# Patient Record
Sex: MALE | Race: BLACK OR AFRICAN AMERICAN | Employment: FULL TIME | URBAN - METROPOLITAN AREA
[De-identification: names, ages, dates, MRNs, and addresses within clinical notes are randomized per-mention and may not be internally consistent; named-entity substitution may affect disease eponyms.]

---

## 2019-10-13 ENCOUNTER — HOSPITAL ENCOUNTER (EMERGENCY)
Age: 49
Discharge: HOME | End: 2019-10-13
Attending: EMERGENCY MEDICINE
Payer: SELF-PAY

## 2019-10-13 ENCOUNTER — APPOINTMENT (OUTPATIENT)
Dept: GENERAL RADIOLOGY | Age: 49
End: 2019-10-13
Attending: NURSE PRACTITIONER
Payer: SELF-PAY

## 2019-10-13 VITALS
BODY MASS INDEX: 24.32 KG/M2 | TEMPERATURE: 97.9 F | OXYGEN SATURATION: 98 % | WEIGHT: 146 LBS | DIASTOLIC BLOOD PRESSURE: 79 MMHG | HEIGHT: 65 IN | RESPIRATION RATE: 15 BRPM | HEART RATE: 98 BPM | SYSTOLIC BLOOD PRESSURE: 113 MMHG
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
## 2019-10-13 DIAGNOSIS — S43.401A SPRAIN OF RIGHT SHOULDER, UNSPECIFIED SHOULDER SPRAIN TYPE, INITIAL ENCOUNTER: ICD-10-CM

## 2019-10-13 PROCEDURE — 74011250637 HC RX REV CODE- 250/637: Performed by: NURSE PRACTITIONER

## 2019-10-13 PROCEDURE — 99283 EMERGENCY DEPT VISIT LOW MDM: CPT

## 2019-10-13 PROCEDURE — 73030 X-RAY EXAM OF SHOULDER: CPT

## 2019-10-13 RX ORDER — NAPROXEN 500 MG/1
500 TABLET ORAL 2 TIMES DAILY WITH MEALS
Qty: 20 TAB | Refills: 0 | Status: SHIPPED | OUTPATIENT
Start: 2019-10-13 | End: 2019-10-23

## 2019-10-13 RX ORDER — IBUPROFEN 400 MG/1
800 TABLET ORAL
Status: COMPLETED | OUTPATIENT
Start: 2019-10-13 | End: 2019-10-13

## 2019-10-13 RX ORDER — METHOCARBAMOL 500 MG/1
500 TABLET, FILM COATED ORAL 4 TIMES DAILY
Qty: 30 TAB | Refills: 0 | Status: SHIPPED | OUTPATIENT
Start: 2019-10-13

## 2019-10-13 RX ADMIN — IBUPROFEN 800 MG: 400 TABLET, FILM COATED ORAL at 18:48

## 2019-10-13 NOTE — ED NOTES
Emergency Department Nursing Plan of Care       The Nursing Plan of Care is developed from the Nursing assessment and Emergency Department Attending provider initial evaluation. The plan of care may be reviewed in the ED Provider note.     The Plan of Care was developed with the following considerations:   Patient / Family readiness to learn indicated by:verbalized understanding  Persons(s) to be included in education: patient  Barriers to Learning/Limitations:No    Signed     Nadira Fuentes RN    10/13/2019   6:29 PM

## 2019-10-13 NOTE — LETTER
[facility] EMERGENCY DEPT 
[address] [address] 
[phone] Work/School Note Date: 10/13/2019 To Whom It May concern: 
 
Soha Patten was seen and treated today in the emergency room by the following provider(s): 
No providers found. Soha Patten may return to work on 10/16/2019. Sincerely, 
 
 
 
 
Elsie Ortiz RN.

## 2019-10-13 NOTE — ED NOTES
Patient educated on discharge instructions and two prescriptions . Patient verbalized understanding of eduction. Patient given discharge instructions and two prescriptions. Patient ambulated out of ED. No acute distress noted. Emergency Department Nursing Plan of Care       The Nursing Plan of Care is developed from the Nursing assessment and Emergency Department Attending provider initial evaluation. The plan of care may be reviewed in the ED Provider note.     The Plan of Care was developed with the following considerations:   Patient / Family readiness to learn indicated by:verbalized understanding  Persons(s) to be included in education: patient  Barriers to Learning/Limitations:No    Signed     Char Davidson RN    10/13/2019   7:24 PM
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
## 2022-12-14 ENCOUNTER — HOSPITAL ENCOUNTER (EMERGENCY)
Age: 52
Discharge: HOME | End: 2022-12-15
Attending: STUDENT IN AN ORGANIZED HEALTH CARE EDUCATION/TRAINING PROGRAM
Payer: MEDICAID

## 2022-12-14 DIAGNOSIS — T40.2X1A OPIOID OVERDOSE, ACCIDENTAL OR UNINTENTIONAL, INITIAL ENCOUNTER (HCC): Primary | ICD-10-CM

## 2022-12-14 PROCEDURE — 74011250636 HC RX REV CODE- 250/636: Performed by: STUDENT IN AN ORGANIZED HEALTH CARE EDUCATION/TRAINING PROGRAM

## 2022-12-14 PROCEDURE — 99284 EMERGENCY DEPT VISIT MOD MDM: CPT

## 2022-12-14 PROCEDURE — 74011250637 HC RX REV CODE- 250/637: Performed by: STUDENT IN AN ORGANIZED HEALTH CARE EDUCATION/TRAINING PROGRAM

## 2022-12-14 RX ORDER — ONDANSETRON 4 MG/1
4 TABLET, ORALLY DISINTEGRATING ORAL
Status: COMPLETED | OUTPATIENT
Start: 2022-12-14 | End: 2022-12-14

## 2022-12-14 RX ORDER — ACETAMINOPHEN 325 MG/1
650 TABLET ORAL ONCE
Status: COMPLETED | OUTPATIENT
Start: 2022-12-14 | End: 2022-12-14

## 2022-12-14 RX ADMIN — ONDANSETRON 4 MG: 4 TABLET, ORALLY DISINTEGRATING ORAL at 23:17

## 2022-12-14 RX ADMIN — ACETAMINOPHEN 650 MG: 325 TABLET, FILM COATED ORAL at 23:17

## 2022-12-14 NOTE — Clinical Note
[address] EMERGENCY DEPT  [address] [facility] [address] [phone]    Work/School Note    Date: 12/14/2022    To Whom It May concern:    Valeria Anne was seen and treated today in the emergency room by the following provider(s):  Attending Provider: Doris Lyn MD.      Valeria Anne is excused from work/school on 12/15/22 and 12/16/22. He is medically clear to return to work/school on 12/17/2022.        Sincerely,          Radha Howell MD

## 2022-12-15 VITALS
HEIGHT: 65 IN | OXYGEN SATURATION: 97 % | TEMPERATURE: 98.2 F | BODY MASS INDEX: 21.66 KG/M2 | HEART RATE: 88 BPM | DIASTOLIC BLOOD PRESSURE: 82 MMHG | SYSTOLIC BLOOD PRESSURE: 120 MMHG | WEIGHT: 130 LBS | RESPIRATION RATE: 20 BRPM

## 2022-12-15 RX ORDER — NALOXONE HYDROCHLORIDE 4 MG/.1ML
SPRAY NASAL
Qty: 2 EACH | Refills: 0 | Status: SHIPPED | OUTPATIENT
Start: 2022-12-15

## 2022-12-15 NOTE — ED NOTES
Patient alert and oriented x 4. Patient no longer anxious or shaking and denies complaints. Family is at bedside. MD updated.

## 2022-12-15 NOTE — DISCHARGE INSTRUCTIONS
Take Narcan prescription and use it if this happens again.   Please return to ER if you develop any chest pain, shortness of breath or worsening symptoms

## 2022-12-15 NOTE — ED NOTES
Discharge instructions were given to the patient by Melodie Ellis RN     The patient left the Emergency Department ambulatory, alert and oriented and in no acute distress with 1 prescriptions. The patient was encouraged to call or return to the ED for worsening issues or problems and was encouraged to schedule a follow up appointment for continuing care. The patient verbalized understanding of discharge instructions and prescriptions, all questions were answered. The patient has no further concerns at this time.
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
## 2022-12-15 NOTE — ED NOTES
Emergency Department Nursing Plan of Care       The Nursing Plan of Care is developed from the Nursing assessment and Emergency Department Attending provider initial evaluation. The plan of care may be reviewed in the ED Provider note.     The Plan of Care was developed with the following considerations:   Patient / Family readiness to learn indicated by:verbalized understanding  Persons(s) to be included in education: patient  Barriers to Learning/Limitations:No    Signed     Gunnar Buenrostro RN    12/14/2022   11:08 PM

## 2022-12-15 NOTE — ED PROVIDER NOTES
EMERGENCY DEPARTMENT HISTORY AND PHYSICAL EXAM      Date: 12/14/2022  Patient Name: Kevin Castañeda    History of Presenting Illness     Chief Complaint   Patient presents with    Drug Overdose       History Provided By: Patient    Kevin Castañeda, 50 y.o. male     -year-old male with no reported medical history presenting after drug overdose from home. Patient presents with EMS stating that he used heroin, last that he remembers is being in the bathroom, received 1 mg Narcan with EMS, patient now alert and oriented, stating that he has myalgias, nausea, chest pain, abdominal pain, aching all over but denying regular use of opioids. Patient denies any coingestions. Patient states that he took a powder and believed it to be heroin. Which he snorted. Patient denies any recent complaint such as fevers, chills, URI symptoms, states that he has otherwise been in his usual state of health, no other complaints today. There are no other complaints, changes, or physical findings at this time. PCP: None    No current facility-administered medications on file prior to encounter. Current Outpatient Medications on File Prior to Encounter   Medication Sig Dispense Refill    methocarbamol (ROBAXIN) 500 mg tablet Take 1 Tab by mouth four (4) times daily. 30 Tab 0       Past History     Past Medical History:  History reviewed. No pertinent past medical history. Past Surgical History:  History reviewed. No pertinent surgical history. Family History:  History reviewed. No pertinent family history. Social History:  Social History     Tobacco Use    Smoking status: Every Day     Types: Cigarettes    Smokeless tobacco: Never   Substance Use Topics    Alcohol use: Not Currently    Drug use: Yes     Types: Heroin       Allergies:  No Known Allergies      Review of Systems   Review of Systems   Constitutional:  Negative for activity change, chills, fatigue and fever. HENT:  Negative for congestion and sneezing. Respiratory:  Negative for cough and shortness of breath. Cardiovascular:  Positive for chest pain. Negative for leg swelling. Gastrointestinal:  Positive for abdominal pain and nausea. Negative for constipation, diarrhea and vomiting. Genitourinary:  Negative for decreased urine volume, dysuria and frequency. Musculoskeletal:  Positive for myalgias. Negative for arthralgias. Skin:  Negative for rash. Allergic/Immunologic: Negative for immunocompromised state. Neurological:  Negative for headaches. Hematological:  Does not bruise/bleed easily. Psychiatric/Behavioral:  Negative for confusion. Physical Exam   Physical Exam  Vitals reviewed. Constitutional:       General: He is not in acute distress. Appearance: He is not ill-appearing, toxic-appearing or diaphoretic. HENT:      Head: Normocephalic and atraumatic. Mouth/Throat:      Mouth: Mucous membranes are moist.   Eyes:      Conjunctiva/sclera: Conjunctivae normal.   Cardiovascular:      Rate and Rhythm: Tachycardia present. Pulmonary:      Effort: Pulmonary effort is normal. No tachypnea, accessory muscle usage or respiratory distress. Abdominal:      Palpations: Abdomen is soft. Tenderness: There is no abdominal tenderness. There is no guarding or rebound. Musculoskeletal:         General: No deformity. Cervical back: Neck supple. Skin:     General: Skin is warm and dry. Neurological:      General: No focal deficit present. Mental Status: He is alert and oriented to person, place, and time. Psychiatric:         Mood and Affect: Mood normal.       Diagnostic Study Results     Labs -   No results found for this or any previous visit (from the past 24 hour(s)). Radiologic Studies -   No orders to display     CT Results  (Last 48 hours)      None          CXR Results  (Last 48 hours)      None              Medical Decision Making   I am the first provider for this patient.     I reviewed the vital signs, available nursing notes, past medical history, past surgical history, family history and social history. Vital Signs-Reviewed the patient's vital signs. Patient Vitals for the past 12 hrs:   Temp Pulse Resp BP SpO2   12/15/22 0100 -- 88 -- 120/82 97 %   12/14/22 2300 98.2 °F (36.8 °C) (!) 107 20 117/87 100 %       Records Reviewed: Nursing records and medical records reviewed    Ddx:, Opioid use, opioid overdose    Initial assessment performed. The patients presenting problems have been discussed, and they are in agreement with the care plan formulated and outlined with them. I have encouraged them to ask questions as they arise throughout their visit. MDM  Number of Diagnoses or Management Options  Diagnosis management comments: Is a 59-year-old male presenting after a likely opioid overdose, received Narcan in route for unresponsiveness, is now alert and oriented, complaining of nausea, myalgias, chest pain, abdominal pain, patient denies regular use although symptoms seem consistent with opioid withdrawal with ongoing tachycardia. Patient otherwise well-appearing and denies any coingestion, denies any other medical complaints for which she takes medication for daily, feel that he would benefit from treatment of nausea, pain and reevaluation, hold off on either labs or imaging at this time as I do not feel like they are indicated with likely OD. If patient continues to complain of chest pain and abdominal pain after medications will consider further work-up. ED Course as of 12/15/22 0115   u Dec 15, 2022   0112 Patient is now alert, responsive, states that he has no pain, will prescribe him Narcan and discharge at this time. [RN]      ED Course User Index  [RN] Evelyn Jacobsen MD           Procedures    Critical Care: none    Disposition: dc    DISCHARGE PLAN:  1.    Current Discharge Medication List        START taking these medications    Details   naloxone (Narcan) 4 mg/actuation nasal spray Use 1 spray intranasally, then discard. Repeat with new spray every 2 min as needed for opioid overdose symptoms, alternating nostrils. Qty: 2 Each, Refills: 0  Start date: 12/15/2022           2. Follow-up Information       Follow up With Specialties Details Why [address] [facility] EMERGENCY DEPT Emergency Medicine  If symptoms worsen [address]          3. Return to ED if worse     Diagnosis     Clinical Impression:   1. Opioid overdose, accidental or unintentional, initial encounter [facility])        Attestations:    Andrea Martinez MD    Please note that this dictation was completed with "[employer] the computer voice recognition software. Quite often unanticipated grammatical, syntax, homophones, and other interpretive errors are inadvertently transcribed by the computer software. Please disregard these errors. Please excuse any errors that have escaped final proofreading. Thank you.

## 2023-01-19 ENCOUNTER — TELEPHONE (OUTPATIENT)
Dept: INTERNAL MEDICINE CLINIC | Age: 53
End: 2023-01-19

## 2023-08-09 ENCOUNTER — NURSE TRIAGE (OUTPATIENT)
Dept: OTHER | Facility: CLINIC | Age: 53
End: 2023-08-09

## 2023-08-09 NOTE — TELEPHONE ENCOUNTER
Location of patient: [address] [facility] [place] call from [place] at [facility] with [facility]. Subjective: Caller states \"stomach pain,pt has hernia, pain in belly button area. Pain worsened in 24 hours. Seen in clinic, states he was told he needed to see PCP about BG\"     Current Symptoms: only feels pain with bearing down and pressure to stomach. Denies n/v/d. No previous treatment for hernia. Pt reports scrotum pain and pain with urination. Onset: 1 month ago;     Associated Symptoms:     Pain Severity: 8/10; sharp; inc with pt    Temperature: denies     What has been tried: none    LMP: NA Pregnant: NA    Recommended disposition: Go to ED Now    Care advice provided, patient verbalizes understanding; denies any other questions or concerns; instructed to call back for any new or worsening symptoms. Patient/caller agrees to proceed to nearest Emergency Department    Attention Provider: Thank you for allowing me to participate in the care of your patient. The patient was connected to triage in response to information provided to the ECC/PSC. Please do not respond through this encounter as the response is not directed to a shared pool.         Reason for Disposition   Pain in scrotum persists > 1 hour    Protocols used: Abdominal Pain - Male-ADULT-[place]

## 2024-05-14 ENCOUNTER — NURSE TRIAGE (OUTPATIENT)
Dept: OTHER | Facility: CLINIC | Age: 54
End: 2024-05-14

## 2024-05-14 NOTE — TELEPHONE ENCOUNTER
Location of patient: [place]    Received call from Linda at [facility]; Patient with Red Flag Complaint requesting to establish care with [facility].    Subjective: Caller states \"stomach pain for some time but getting worse\"     Current Symptoms: lower abdominal pain, difficulty urinating, feels like bladder is full after trying to urinate.     Onset: several months ago; worsening    Associated Symptoms: reduced activity, increased wakefulness    Pain Severity: 6/10; cramping and pressure; constant    Temperature: no fever     What has been tried: nothing    LMP: NA Pregnant: NA    Recommended disposition: Go to ED Now    Care advice provided, patient verbalizes understanding; denies any other questions or concerns; instructed to call back for any new or worsening symptoms.    Patient/caller agrees to proceed to the Emergency Department    Attention Provider:  Thank you for allowing me to participate in the care of your patient.  The patient was connected to triage in response to information provided to the [facility].  Please do not respond through this encounter as the response is not directed to a shared pool.        Reason for Disposition   Unable to urinate (or only a few drops) and bladder feels very full    Protocols used: Abdominal Pain - Male-ADULT-[place]

## 2025-01-10 ENCOUNTER — TELEPHONE (OUTPATIENT)
Age: 55
End: 2025-01-10

## 2025-01-10 NOTE — TELEPHONE ENCOUNTER
[phone]  Elias bernal [id number]    Pain in my gentils and it is getting worse  Wants a dr negar GARCIA

## 2025-01-10 NOTE — TELEPHONE ENCOUNTER
Returned call to pt, advised to go to urgent care, no available appointments .  Dr Hummel out until Monday.  Pt stated understanding